# Patient Record
(demographics unavailable — no encounter records)

---

## 2019-06-10 NOTE — SLEEPHOME
DATE OF PROCEDURE:  06/06/2019

 

ORDERED BY:  Dr. Fuentes

 

Diagnostic home sleep testing was performed due to concern for the obstructive

sleep apnea syndrome in a patient with a history of nonrestorative sleep,

comorbidities of hypothyroidism and acid reflux disease.

 

For testing a nocturnal T3 respiratory monitoring device was used.  Continuous

record was made of pulse, oxygen saturation, airflow, chest and abdominal strain

and body position.

 

9 hours and 59 minutes of data were reviewed.  There were 7 hours and 4 minutes

marked as time in bed.  During the interval marked time in bed, there were 231

respiratory events identified of 10 seconds in duration or greater for a

respiratory event index of 32.7.  The events were primarily obstructive.

Baseline pulse rate 65, pulse rate ranged .  Baseline saturation 87%.

Saturations fell as low as 57%.  Testing was performed in both the supine and

nonsupine positions.

 

IMPRESSION:

Abnormal home sleep testing with repetitive respiratory events and oxygen

desaturations to 57% with a respiratory event index of 32.7 is consistent with

the obstructive sleep apnea syndrome.

 

RECOMMENDATIONS:

The patient should be encouraged to undergo a formal sleep evaluation and in

laboratory pressure titration.